# Patient Record
Sex: FEMALE | Race: WHITE | NOT HISPANIC OR LATINO | URBAN - METROPOLITAN AREA
[De-identification: names, ages, dates, MRNs, and addresses within clinical notes are randomized per-mention and may not be internally consistent; named-entity substitution may affect disease eponyms.]

---

## 2019-03-09 ENCOUNTER — EMERGENCY (EMERGENCY)
Facility: HOSPITAL | Age: 49
LOS: 1 days | Discharge: HOME | End: 2019-03-09
Attending: EMERGENCY MEDICINE | Admitting: EMERGENCY MEDICINE
Payer: COMMERCIAL

## 2019-03-09 VITALS
DIASTOLIC BLOOD PRESSURE: 76 MMHG | TEMPERATURE: 99 F | HEIGHT: 67 IN | WEIGHT: 125 LBS | OXYGEN SATURATION: 99 % | RESPIRATION RATE: 20 BRPM | SYSTOLIC BLOOD PRESSURE: 139 MMHG | HEART RATE: 107 BPM

## 2019-03-09 PROCEDURE — 73502 X-RAY EXAM HIP UNI 2-3 VIEWS: CPT

## 2019-03-09 PROCEDURE — 73502 X-RAY EXAM HIP UNI 2-3 VIEWS: CPT | Mod: 26,RT

## 2019-03-09 PROCEDURE — 72170 X-RAY EXAM OF PELVIS: CPT

## 2019-03-09 PROCEDURE — 99283 EMERGENCY DEPT VISIT LOW MDM: CPT

## 2019-03-09 PROCEDURE — 99284 EMERGENCY DEPT VISIT MOD MDM: CPT

## 2019-03-09 RX ORDER — IBUPROFEN 200 MG
600 TABLET ORAL ONCE
Qty: 0 | Refills: 0 | Status: COMPLETED | OUTPATIENT
Start: 2019-03-09 | End: 2019-03-09

## 2019-03-09 RX ADMIN — Medication 600 MILLIGRAM(S): at 22:21

## 2019-03-09 NOTE — ED PROVIDER NOTE - OBJECTIVE STATEMENT
Pt previously healthy with complaints of r hip pain after skiing accident where fell on r hip, no assoc numbness, paresthesias, no head injury. no assoc sob, chest pain, Pt has not tried anything for symptoms, no other aggravating or relieving factors.

## 2019-03-09 NOTE — ED ADULT TRIAGE NOTE - OTHER COMPLAINTS
CC of fall while skiing downhill, hit the snow when she fell, no head trauma, LOC, vomiting having hard time to move R hip.

## 2019-03-09 NOTE — CONSULT NOTE ADULT - ASSESSMENT
A/P: 49yFemale w no significant PMHx presents w R inferior pubic ramus fracture    -No surgical intervention necessary at this time  -WBAT RLE  -Crutches for comfort w ambulation  -Pain medication as per ED  -Outpatient follow-up with Dr. Marie  -Discussed with Dr. Marie    Ortho Pager [phone]

## 2019-03-09 NOTE — ED PROVIDER NOTE - NSFOLLOWUPINSTRUCTIONS_ED_ALL_ED_FT
Pelvic Fracture    WHAT YOU NEED TO KNOW:    What is a pelvic fracture? A pelvic fracture is a break in 1 or more of your 5 pelvic (hip) bones. This also includes a fracture of the acetabulum, the part of your pelvis that makes up your hip joint. Pelvic fractures can be caused by a car accident or a fall from a great height. Some pelvic fractures are caused by minor falls or injuries.     What are the signs and symptoms of a pelvic fracture?     Pain and tenderness in your pelvic bone area      Bruising and swelling over your pelvic bones      Numbness or tingling in your genital area or in your upper thighs      Discomfort or pain when you sit, stand, walk, or have a bowel movement    How is a pelvic fracture diagnosed? Your healthcare provider will examine your pelvic area. He will also check the range of motion of your hip. An x-ray or CT scan of your pelvis is used to check for broken bones. You may be given a dye before the scan. Tell your healthcare provider if you have ever had an allergic reaction to contrast dye.    How is a pelvic fracture treated? Treatment depends on the kind of fracture you have. You may need any of the following:    Prescription pain medicine may be given. Ask your healthcare provider how to take this medicine safely.      Bed rest will be needed while your fracture heals.       Apply ice on your hip for 15 to 20 minutes every hour or as directed. Use an ice pack, or put crushed ice in a plastic bag. Cover it with a towel. Ice helps prevent tissue damage and decreases swelling and pain.      Crutches or a walker may be needed to keep weight off your hip bone until it heals.       An external fixation device may be put on your hips to hold the broken bones together while they heal. Screws or a clamp will be used to hold the device to your pelvic bones.       Surgery may be needed for a severe pelvic fracture. Metal pins, screws, or plates may be used to hold your pelvic bone together.    When should I contact my healthcare provider?     Your skin is itchy, swollen, or has a rash.       Your pain or swelling increases.      You have new symptoms.      You have a fever.       You have questions or concerns about your condition or care.    When should I seek immediate care or call 911?     You feel lightheaded, short of breath, and have chest pain.      You cough up blood.      Your leg feels warm, tender, and painful. It may look swollen and red.    CARE AGREEMENT:    You have the right to help plan your care. Learn about your health condition and how it may be treated. Discuss treatment options with your healthcare providers to decide what care you want to receive. You always have the right to refuse treatment.        © Copyright [employer] 2019 All illustrations and images included in CareNotes are the copyrighted property of [email or web address] or [employer].      back to top                      © Copyright [employer] 2019

## 2019-03-09 NOTE — ED ADULT NURSE NOTE - NSIMPLEMENTINTERV_GEN_ALL_ED
Implemented All Fall Risk Interventions:  Baker to call system. Call bell, personal items and telephone within reach. Instruct patient to call for assistance. Room bathroom lighting operational. Non-slip footwear when patient is off stretcher. Physically safe environment: no spills, clutter or unnecessary equipment. Stretcher in lowest position, wheels locked, appropriate side rails in place. Provide visual cue, wrist band, yellow gown, etc. Monitor gait and stability. Monitor for mental status changes and reorient to person, place, and time. Review medications for side effects contributing to fall risk. Reinforce activity limits and safety measures with patient and family.

## 2019-03-09 NOTE — CONSULT NOTE ADULT - SUBJECTIVE AND OBJECTIVE BOX
Orthopaedic Surgery Consult Note    For Surgeon: Cynthia	    HPI:  49yFemale who presents w R hip pain following a recent skiing accident. Pt states she was skiing and suddenly lost her balance after hitting a large snow bank and fell onto her R side with immediate pain in her R hip. Pt has been intermittently able to ambulate w significant pain in R hip. Denies other injuries or sxs at this time. Denies any preceding sxs prior to her fall. Denies head trauma/LOC    Allergies    penicillin (Rash; Urticaria)    Intolerances      PAST MEDICAL & SURGICAL HISTORY:    MEDICATIONS  (STANDING):    MEDICATIONS  (PRN):      Vital Signs Last 24 Hrs  T(C): 37.1 (09 Mar 2019 20:08), Max: 37.1 (09 Mar 2019 20:08)  T(F): 98.7 (09 Mar 2019 20:08), Max: 98.7 (09 Mar 2019 20:08)  HR: 107 (09 Mar 2019 20:08) (107 - 107)  BP: 139/76 (09 Mar 2019 20:08) (139/76 - 139/76)  BP(mean): --  RR: 20 (09 Mar 2019 20:08) (20 - 20)  SpO2: 99% (09 Mar 2019 20:08) (99% - 99%)    Physical Exam:  AVSS  General: Adult female lying supine; NAD; A&Ox3  RLE: No obvious deformity of the extremity; No significant areas of swelling/erythema/warmth; TTP in R groin area but non-tender elsewhere over the extremity; No pain w log roll or heel strike; NVID; Sensation symmetric to LLE; Foot WWP; TA/EHL/FHL/GS 5/5 and symmetric; No pain w passive hip ROM; Hip pain w active straight leg raise          Imaging: AP Pelvis/R Hip XR shows minimally displaced R inferior pubic ramus fracture

## 2019-03-09 NOTE — ED PROVIDER NOTE - PHYSICAL EXAMINATION
CONSTITUTIONAL: Well appearing, well nourished, awake, alert and in no apparent distress.  HEENT: Head is atraumatic. Eyes clear bilaterally, normal EOMI. Airway patent.  CARDIAC: Normal rate, regular rhythm.  Heart sounds S1, S2.   RESPIRATORY: Breath sounds clear and equal bilaterally. no tachypnea, respiratory distress.   GASTROINTESTINAL: Abdomen soft, non-tender, no guarding, distension.  MUSCULOSKELETAL: Spine appears normal, no midline spinal tenderness, range of motion is not limited, no muscle or joint tenderness. no bony tenderness. no JVD, peripheral edema. pain to R groin  NEUROLOGICAL: Alert and oriented, no focal deficits, no motor or sensory deficits.  SKIN: Skin normal color for race, warm, dry and intact. No evidence of rash.  PSYCHIATRIC: Alert and oriented to person, place, time/situation. normal mood and affect. no apparent risk to self or others.

## 2019-03-09 NOTE — ED PROVIDER NOTE - CARE PROVIDER_API CALL
Easton Marie)  Orthopaedic Surgery Surgery  [address] [place] [address]  Phone: [phone]  Fax: [phone]  Follow Up Time:

## 2019-03-10 VITALS
HEART RATE: 93 BPM | SYSTOLIC BLOOD PRESSURE: 130 MMHG | OXYGEN SATURATION: 96 % | TEMPERATURE: 99 F | RESPIRATION RATE: 18 BRPM | DIASTOLIC BLOOD PRESSURE: 76 MMHG

## 2019-03-10 RX ADMIN — Medication 600 MILLIGRAM(S): at 00:12

## 2019-03-13 DIAGNOSIS — S32.501A UNSPECIFIED FRACTURE OF RIGHT PUBIS, INITIAL ENCOUNTER FOR CLOSED FRACTURE: ICD-10-CM
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
## 2019-03-13 DIAGNOSIS — R10.31 RIGHT LOWER QUADRANT PAIN: ICD-10-CM

## 2019-03-13 DIAGNOSIS — Z88.0 ALLERGY STATUS TO PENICILLIN: ICD-10-CM
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
## 2019-03-13 PROBLEM — Z00.00 ENCOUNTER FOR PREVENTIVE HEALTH EXAMINATION: Status: ACTIVE | Noted: 2019-03-13

## 2019-03-18 ENCOUNTER — FORM ENCOUNTER (OUTPATIENT)
Age: 49
End: 2019-03-18

## 2019-03-19 ENCOUNTER — OUTPATIENT (OUTPATIENT)
Dept: OUTPATIENT SERVICES | Facility: HOSPITAL | Age: 49
LOS: 1 days | End: 2019-03-19
Payer: COMMERCIAL

## 2019-03-19 ENCOUNTER — APPOINTMENT (OUTPATIENT)
Dept: CT IMAGING | Facility: CLINIC | Age: 49
End: 2019-03-19

## 2019-03-19 ENCOUNTER — APPOINTMENT (OUTPATIENT)
Dept: ORTHOPEDIC SURGERY | Facility: CLINIC | Age: 49
End: 2019-03-19
Payer: MEDICAID

## 2019-03-19 ENCOUNTER — APPOINTMENT (OUTPATIENT)
Dept: RADIOLOGY | Facility: CLINIC | Age: 49
End: 2019-03-19

## 2019-03-19 DIAGNOSIS — Z78.9 OTHER SPECIFIED HEALTH STATUS: ICD-10-CM

## 2019-03-19 DIAGNOSIS — S32.111A MINIMALLY DISPLACED ZONE I FRACTURE OF SACRUM, INITIAL ENCOUNTER FOR CLOSED FRACTURE: ICD-10-CM

## 2019-03-19 DIAGNOSIS — Z81.8 FAMILY HISTORY OF OTHER MENTAL AND BEHAVIORAL DISORDERS: ICD-10-CM

## 2019-03-19 PROCEDURE — 72192 CT PELVIS W/O DYE: CPT | Mod: 26

## 2019-03-19 PROCEDURE — 27197 CLSD TX PELVIC RING FX: CPT

## 2019-03-19 PROCEDURE — 72190 X-RAY EXAM OF PELVIS: CPT | Mod: 26

## 2019-03-19 PROCEDURE — 72190 X-RAY EXAM OF PELVIS: CPT

## 2019-03-19 PROCEDURE — 99204 OFFICE O/P NEW MOD 45 MIN: CPT | Mod: 57

## 2019-03-19 PROCEDURE — 72192 CT PELVIS W/O DYE: CPT

## 2019-03-22 PROBLEM — Z81.8 FAMILY HISTORY OF DEMENTIA: Status: ACTIVE | Noted: 2019-03-19

## 2019-03-22 PROBLEM — S32.111A CLOSED MINIMALLY DISPLACED ZONE I FRACTURE OF SACRUM, INITIAL ENCOUNTER: Noted: 2019-03-19

## 2019-03-22 PROBLEM — Z78.9 EXERCISES OCCASIONALLY: Status: ACTIVE | Noted: 2019-03-19

## 2019-04-29 ENCOUNTER — FORM ENCOUNTER (OUTPATIENT)
Age: 49
End: 2019-04-29

## 2019-04-30 ENCOUNTER — APPOINTMENT (OUTPATIENT)
Dept: RADIOLOGY | Facility: CLINIC | Age: 49
End: 2019-04-30

## 2019-04-30 ENCOUNTER — APPOINTMENT (OUTPATIENT)
Dept: ORTHOPEDIC SURGERY | Facility: CLINIC | Age: 49
End: 2019-04-30
Payer: COMMERCIAL

## 2019-04-30 ENCOUNTER — OUTPATIENT (OUTPATIENT)
Dept: OUTPATIENT SERVICES | Facility: HOSPITAL | Age: 49
LOS: 1 days | End: 2019-04-30
Payer: COMMERCIAL

## 2019-04-30 DIAGNOSIS — S32.591A OTHER SPECIFIED FRACTURE OF RIGHT PUBIS, INITIAL ENCOUNTER FOR CLOSED FRACTURE: ICD-10-CM

## 2019-04-30 DIAGNOSIS — S32.511A FRACTURE OF SUPERIOR RIM OF RIGHT PUBIS, INITIAL ENCOUNTER FOR CLOSED FRACTURE: ICD-10-CM

## 2019-04-30 DIAGNOSIS — S32.121A: ICD-10-CM

## 2019-04-30 PROCEDURE — 72190 X-RAY EXAM OF PELVIS: CPT

## 2019-04-30 PROCEDURE — 99212 OFFICE O/P EST SF 10 MIN: CPT

## 2019-04-30 PROCEDURE — 72190 X-RAY EXAM OF PELVIS: CPT | Mod: 26

## 2019-06-28 ENCOUNTER — MOBILE ON CALL (OUTPATIENT)
Age: 49
End: 2019-06-28

## 2019-06-30 ENCOUNTER — FORM ENCOUNTER (OUTPATIENT)
Age: 49
End: 2019-06-30

## 2019-07-01 ENCOUNTER — OUTPATIENT (OUTPATIENT)
Dept: OUTPATIENT SERVICES | Facility: HOSPITAL | Age: 49
LOS: 1 days | End: 2019-07-01
Payer: COMMERCIAL

## 2019-07-01 ENCOUNTER — APPOINTMENT (OUTPATIENT)
Dept: RADIOLOGY | Facility: CLINIC | Age: 49
End: 2019-07-01

## 2019-07-01 ENCOUNTER — APPOINTMENT (OUTPATIENT)
Dept: ORTHOPEDIC SURGERY | Facility: CLINIC | Age: 49
End: 2019-07-01
Payer: MEDICAID

## 2019-07-01 VITALS — BODY MASS INDEX: 19.62 KG/M2 | WEIGHT: 125 LBS | HEIGHT: 67 IN

## 2019-07-01 DIAGNOSIS — S32.9XXA FRACTURE OF UNSPECIFIED PARTS OF LUMBOSACRAL SPINE AND PELVIS, INITIAL ENCOUNTER FOR CLOSED FRACTURE: ICD-10-CM

## 2019-07-01 PROCEDURE — 72190 X-RAY EXAM OF PELVIS: CPT | Mod: 26

## 2019-07-01 PROCEDURE — 99213 OFFICE O/P EST LOW 20 MIN: CPT

## 2019-07-01 PROCEDURE — 72190 X-RAY EXAM OF PELVIS: CPT

## 2020-12-08 ENCOUNTER — APPOINTMENT (OUTPATIENT)
Dept: ORTHOPEDIC SURGERY | Facility: CLINIC | Age: 50
End: 2020-12-08
Payer: MEDICAID

## 2020-12-08 VITALS — RESPIRATION RATE: 16 BRPM | HEIGHT: 67 IN | BODY MASS INDEX: 19.78 KG/M2 | WEIGHT: 126 LBS

## 2020-12-08 DIAGNOSIS — M25.551 PAIN IN RIGHT HIP: ICD-10-CM

## 2020-12-08 PROCEDURE — 99214 OFFICE O/P EST MOD 30 MIN: CPT

## 2020-12-08 PROCEDURE — 99072 ADDL SUPL MATRL&STAF TM PHE: CPT

## 2021-03-15 ENCOUNTER — APPOINTMENT (OUTPATIENT)
Dept: ENDOCRINOLOGY | Facility: CLINIC | Age: 51
End: 2021-03-15
Payer: COMMERCIAL

## 2021-03-15 VITALS
WEIGHT: 125 LBS | HEIGHT: 67 IN | SYSTOLIC BLOOD PRESSURE: 128 MMHG | HEART RATE: 89 BPM | DIASTOLIC BLOOD PRESSURE: 87 MMHG | BODY MASS INDEX: 19.62 KG/M2

## 2021-03-15 PROCEDURE — 99072 ADDL SUPL MATRL&STAF TM PHE: CPT

## 2021-03-15 PROCEDURE — 99205 OFFICE O/P NEW HI 60 MIN: CPT

## 2021-03-15 NOTE — HISTORY OF PRESENT ILLNESS
[FreeTextEntry1] : 52 y/o F self referral for thyroid nodules evaluation.\par Internist, Dr. Ceja [phone]\par She was initially diagnosed with thyroid nodules in 2009 she underwent a biopsy of 4 nodules in the left lobe : benign ( goiter)\par She also was diagnosed in the same year with Hashimoto thyroiditis ( Pat seen by Jason Khan )\par Her thyroid test and thyroid nodules have been monitored on and off over the past 10 years she never was Rx thyroid supplementation and couple months ago she went to see her internist  who recommend recommend an endocrinologist visit\par Overall patient is feeling well no complaints she denies speech/swallowing/gradient complains\par No palpitations\par Medicines: None\par September 3, 2020, thyroid ultrasound left lobe size is normal. Contains a 2.1 x 1.6 cm solid nodule with a small echogenic densities in the mid pole and 1.9 x 1.7 cm solid nodule in the lower pole to be a there is also a 2.4 x 1.7 cm solid nodule in the lower pole.\par Other PMHx:  Hip bone fracture during skiing in 2019. Ni direct or indirect radiation exposure as a kid\par Allergy: PCN\par FHx:Thyroid  (Mother). No family history of thyroid cancer\par SHx: [employer], no etoh, no tobacco \par Lifestyle: Physically active. \par 11/30/2009: Thyroid u.s: Multiple nodules seen scattered throughout this lobe,Dominant nodule in the lower pole measure 2.5 cm\par 11/30/2009, TPO 81.7 ( < 60) \par \par

## 2021-03-15 NOTE — ASSESSMENT
[FreeTextEntry1] : 1. Multiple thyroid nodules diagnosed in 2009 with a benign FNA of left lobe biopsy the  same year\par Her recent followup visit with her internist, she was recommended FNA to have followup with endocrinologist\par Patient is asymptomatic, denies to speech, breathing swallowing difficulties\par No history of thyroid cancer neither radiation exposure as a child/ puber\par Send for high resolution thyroid u.s that include lymph nodes.\par \par 2. Increase level of TPO ab in 2009\par She appeared appears to be euthyroid clinically\par Send TFT and thyroid ab\par Return in a month

## 2021-03-15 NOTE — PHYSICAL EXAM
[Alert] : alert [Normal Outer Ear/Nose] : the ears and nose were normal in appearance [No Respiratory Distress] : no respiratory distress [Normal PMI] : the apical impulse was normal [Carotids Normal] : carotid pulses were normal with no bruits [Normal Bowel Sounds] : normal bowel sounds [No CVA Tenderness] : no ~M costovertebral angle tenderness [No Stigmata of Cushings Syndrome] : no stigmata of Cushings Syndrome [Normal Gait] : normal gait [No Rash] : no rash [Normal Reflexes] : deep tendon reflexes were 2+ and symmetric [Oriented x3] : oriented to person, place, and time [de-identified] : Left lumps visualized and palpated at the left lobe, smooth borders and no tenderness. No LN enlargement

## 2021-03-19 ENCOUNTER — OUTPATIENT (OUTPATIENT)
Dept: OUTPATIENT SERVICES | Facility: HOSPITAL | Age: 51
LOS: 1 days | End: 2021-03-19

## 2021-03-19 ENCOUNTER — APPOINTMENT (OUTPATIENT)
Dept: ULTRASOUND IMAGING | Facility: CLINIC | Age: 51
End: 2021-03-19
Payer: COMMERCIAL

## 2021-03-19 PROCEDURE — 76536 US EXAM OF HEAD AND NECK: CPT | Mod: 26

## 2021-04-07 ENCOUNTER — APPOINTMENT (OUTPATIENT)
Dept: ENDOCRINOLOGY | Facility: CLINIC | Age: 51
End: 2021-04-07
Payer: COMMERCIAL

## 2021-04-07 VITALS — HEART RATE: 79 BPM | SYSTOLIC BLOOD PRESSURE: 118 MMHG | DIASTOLIC BLOOD PRESSURE: 77 MMHG

## 2021-04-07 PROCEDURE — 99214 OFFICE O/P EST MOD 30 MIN: CPT

## 2021-04-07 PROCEDURE — 99072 ADDL SUPL MATRL&STAF TM PHE: CPT

## 2021-04-09 NOTE — REVIEW OF SYSTEMS
[Negative] : Heme/Lymph [Dysphagia] : no dysphagia [Dysphonia] : no dysphonia [Palpitations] : no palpitations [Difficulty Breathing] : no dyspnea

## 2021-04-09 NOTE — ASSESSMENT
Patient tolerated bilateral lumbar medial branch block well.  Patient states pain is 7/10 which is improved from 10/10 before procedure.  Patient educated on s/s of infection r/t procedure, \"for your well-being\" medial branch nerve block education reviewed with and given to patient-denies questions.  Patient discharged in stable condition.     [FreeTextEntry1] : 52 y/o F pt with:\par \par 1. Multiple thyroid nodules (dx in 2009):\par FNA biopsies on 09/08/09 of 4 L lobe nodules including L mid 1.3 cm, L mid-lower, L #3 and L #4 nodules: Nodular goiter\par On 3/15/21, she had her initial visit with us. Reviewed the most recent thyroid US on 3/19/21 which shows presence of L lobe with 3 visible nodules. See HPI for details. \par Radiologist recommends FNA biopsy for 3 L lobe nodules. \par In 2009, L upper pole measures 3.9 cm. \par TSH was normal on 3/15/21. \par Pt asked numerous question and we answered all the question to her satisfaction. \par Refer pt for FNA biopsy. \par \par Return in: 3 weeks

## 2021-04-09 NOTE — HISTORY OF PRESENT ILLNESS
[FreeTextEntry1] : 50 y/o F, with Hx of thyroid nodules (dx in 2009), and Hx of Hashimoto thyroiditis (dx in 2009). \par Other PMHx:  Hip bone fx (2019 during skiing)\par No Hx of head and neck radiation exposure during childhood. \par FHx: Thyroid (mother). No FHx of thyroid CA. \par SHx: Non smoker. No EtOH use. Works as [employer]. \par Lifestyle: Physically active. \par PCP: Dr. Ceja [phone]\par \par 03/15/21\par She was initially diagnosed with thyroid nodules in 2009 she underwent a biopsy of 4 nodules in the left lobe : benign (goiter)\par She also was diagnosed in the same year with Hashimoto thyroiditis ( Pat seen by Jason Khan )\par Her thyroid test and thyroid nodules have been monitored on and off over the past 10 years she never was Rx thyroid supplementation and couple months ago she went to see her internist  who recommend recommend an endocrinologist visit\par Overall patient is feeling well no complaints she denies speech/swallowing/gradient complains\par No palpitations\par \par 04/07/2021\par Pt presents today with /77 for thyroid f/u. She is feeling generally well with no major physical complaints. \par Pt states that she "would not like to do FNA biopsies if it is not absolutely necessary because the process is very painful". \par Denies speech, swallowing or breathing difficulties and palpitations. \par \par Current Medications: None\par \par Labs:\par - 03/19/21 Thyroid US: R lobe contains no visible nodules. L lobe contains 3 visible nodules. Nodule 1 - interpolar 1.8 x 1.8 x 2.1 cm solid. Nodule 2 - interpolar 2.5 x 2.3 x 2.5 cm predominantly solid with cystic components. Nodule 3 - interpolar 2.6 x 1.5 x 2.1 cm solid. Impression: 3 L nodules, which all meet FNA criteria. \par - 03/15/21: TSH 1.37, Free T4 1.3, TPO Ab 13.0, Thyroglobulin Ab <20\par - 09/03/20 Thyroid US: L lobe contains 3 nodules. Nodule 1 - midpole 2.1 x 1.6 cm solid with small echogenic densities. Nodule 2 - lower pole 1.9 x 1.7 cm solid. Nodule 3 - lower pole 2.4 z 1.7 cm solid nodule. \par - 11/30/09: Thyroid US: Multiple nodules seen scattered throughout this lobe. Dominant nodule in the lower pole measure 2.5 cm.\par - 11/30/09: TSH 1.67, Free T4 1.13, Free T3 3.2, T4 7.5, Total T3 88, TPO 81.7 (< 60), Thyroglobulin Ab 162.2, iPTH 29, Prolactin 8.9\par - 09/08/09 FNA biopsies of L mid 1.3 cm, L mid-lower, L #3 and L #4 nodules: Nodular goiter\par - 08/06/09 Thyroid US: Impression: 1) Enlarged multinodular L lobe with increased vascularity. 2) Two L thyroid nodules contain echogenic internal foci which may represent microcalcifications. FNA of these nodules is recommended. 3) Additional isoechoic nodule in the L lobe. F/u US in 6 months is recommended to ensure stability. 4) Normal R lobe.

## 2021-04-09 NOTE — END OF VISIT
[FreeTextEntry3] : All medical record entries made by the Scribe were at my, Dr. Александр Benton, direction and personally dictated by me on 04/07/2021. I have reviewed the chart and agree that the record accurately reflects my personal performance of the history, physical exam, assessment and plan. I have also personally directed, reviewed and agreed with the chart.  [Time Spent: ___ minutes] : I have spent [unfilled] minutes of time on the encounter.

## 2021-04-09 NOTE — PHYSICAL EXAM
[Alert] : alert [Normal Outer Ear/Nose] : the ears and nose were normal in appearance [No Respiratory Distress] : no respiratory distress [Normal PMI] : the apical impulse was normal [Carotids Normal] : carotid pulses were normal with no bruits [Normal Bowel Sounds] : normal bowel sounds [No CVA Tenderness] : no ~M costovertebral angle tenderness [No Stigmata of Cushings Syndrome] : no stigmata of Cushings Syndrome [Normal Gait] : normal gait [No Rash] : no rash [Normal Reflexes] : deep tendon reflexes were 2+ and symmetric [Oriented x3] : oriented to person, place, and time [de-identified] : Left lumps visualized and palpated at the left lobe, smooth borders and no tenderness. No LN enlargement

## 2021-04-09 NOTE — ADDENDUM
[FreeTextEntry1] : I, Mary Lee, acted solely as a scribe for Dr. Александр Benton on this date. 04/07/2021.

## 2021-05-28 ENCOUNTER — NON-APPOINTMENT (OUTPATIENT)
Age: 51
End: 2021-05-28

## 2021-06-01 LAB
ALBUMIN SERPL ELPH-MCNC: 5.4 G/DL
ALP BLD-CCNC: 76 U/L
ALT SERPL-CCNC: 12 U/L
ANION GAP SERPL CALC-SCNC: 11 MMOL/L
AST SERPL-CCNC: 16 U/L
BILIRUB SERPL-MCNC: 0.4 MG/DL
BUN SERPL-MCNC: 14 MG/DL
CALCIUM SERPL-MCNC: 10.5 MG/DL
CHLORIDE SERPL-SCNC: 101 MMOL/L
CO2 SERPL-SCNC: 28 MMOL/L
CREAT SERPL-MCNC: 0.92 MG/DL
GLUCOSE SERPL-MCNC: 92 MG/DL
POTASSIUM SERPL-SCNC: 4.5 MMOL/L
PROT SERPL-MCNC: 7.9 G/DL
SODIUM SERPL-SCNC: 140 MMOL/L
T4 FREE SERPL-MCNC: 1.3 NG/DL
THYROGLOB AB SERPL-ACNC: <20 IU/ML
THYROPEROXIDASE AB SERPL IA-ACNC: 13 IU/ML
TSH SERPL-ACNC: 1.37 UIU/ML

## 2023-07-05 NOTE — ED ADULT NURSE NOTE - GASTROINTESTINAL ASSESSMENT
Refill Decision Note   Isha Thomas  is requesting a refill authorization.  Brief Assessment and Rationale for Refill:  Approve     Medication Therapy Plan:         Comments:     No Care Gaps recommended.     Note composed:6:58 AM 07/05/2023             WDL

## 2024-02-27 ENCOUNTER — APPOINTMENT (OUTPATIENT)
Dept: ENDOCRINOLOGY | Facility: CLINIC | Age: 54
End: 2024-02-27
Payer: COMMERCIAL

## 2024-02-27 VITALS
HEART RATE: 77 BPM | BODY MASS INDEX: 20.25 KG/M2 | WEIGHT: 129 LBS | HEIGHT: 67 IN | SYSTOLIC BLOOD PRESSURE: 127 MMHG | DIASTOLIC BLOOD PRESSURE: 80 MMHG

## 2024-02-27 PROCEDURE — 99214 OFFICE O/P EST MOD 30 MIN: CPT

## 2024-02-27 NOTE — REASON FOR VISIT
[Thyroid nodule/ MNG] : thyroid nodule/ MNG [Follow - Up] : a follow-up visit [Osteoporosis] : osteoporosis

## 2024-02-29 NOTE — PHYSICAL EXAM
[Normal Outer Ear/Nose] : the ears and nose were normal in appearance [Normal PMI] : the apical impulse was normal [No Respiratory Distress] : no respiratory distress [Carotids Normal] : carotid pulses were normal with no bruits [Normal Bowel Sounds] : normal bowel sounds [No CVA Tenderness] : no ~M costovertebral angle tenderness [No Stigmata of Cushings Syndrome] : no stigmata of Cushings Syndrome [Normal Gait] : normal gait [No Rash] : no rash [Oriented x3] : oriented to person, place, and time [Normal Reflexes] : deep tendon reflexes were 2+ and symmetric [No Acute Distress] : no acute distress [Normal Sclera/Conjunctiva] : normal sclera/conjunctiva [de-identified] : Left lumps visualized and palpated at the left lobe, smooth borders and no tenderness. No LN enlargement

## 2024-02-29 NOTE — ADDENDUM
[FreeTextEntry1] : I, Howard You act solely as a scribe for Dr. Александр Benton on this date 02/27/2024

## 2024-02-29 NOTE — END OF VISIT
[FreeTextEntry3] :  All medical record entries made by the Scribe were at my, Dr. Александр Benton, direction and personally dictated by me on 02/27/2024. I have reviewed the chart and agree that the record accurately reflects my personal performance of the history, physical exam, assessment and plan. I have also personally directed, reviewed and agreed with the chart. [Time Spent: ___ minutes] : I have spent [unfilled] minutes of time on the encounter.

## 2024-02-29 NOTE — DATA REVIEWED
[FreeTextEntry1] : 05/11/21 FNA Specimen A: left lobe mid pole posterior [place] II. Specimen B: left lobe mid pole anterior [place] II. Specimen C: left lobe mid pole medial [place] II.

## 2024-02-29 NOTE — RESULTS/DATA
[Other: ________] : [unfilled] [BMD ___ g/cm2] : BMD: [unfilled] g/cm2 [T-Score ___] : T-score: [unfilled] [FreeTextEntry2] : 01/19/24 [FreeTextEntry1] : Impression: The patient has established osteoporosis, based on the total Spine T-score and the existence of a prior fracture. The patient has risk factors, including: previous fractures. No significant bone loss was observed.  [FreeTextEntry4] : 09/03/20 [de-identified] : Impression: The patient has established osteoporosis, based on the total Spine T-score and the existence of a prior fracture. The patient has risk factors, including: previous fractures.

## 2024-03-17 LAB
25(OH)D3 SERPL-MCNC: 20.4 NG/ML
ALBUMIN SERPL ELPH-MCNC: 4.7 G/DL
ALP BLD-CCNC: 83 U/L
ALT SERPL-CCNC: 10 U/L
ANION GAP SERPL CALC-SCNC: 10 MMOL/L
AST SERPL-CCNC: 13 U/L
BASOPHILS # BLD AUTO: 0.03 K/UL
BASOPHILS NFR BLD AUTO: 0.7 %
BILIRUB SERPL-MCNC: 0.5 MG/DL
BUN SERPL-MCNC: 13 MG/DL
CALCIUM SERPL-MCNC: 9.7 MG/DL
CALCIUM SERPL-MCNC: 9.7 MG/DL
CHLORIDE SERPL-SCNC: 103 MMOL/L
CO2 SERPL-SCNC: 28 MMOL/L
CREAT SERPL-MCNC: 0.85 MG/DL
EGFR: 81 ML/MIN/1.73M2
EOSINOPHIL # BLD AUTO: 0.07 K/UL
EOSINOPHIL NFR BLD AUTO: 1.6 %
ESTIMATED AVERAGE GLUCOSE: 105 MG/DL
GLUCOSE SERPL-MCNC: 95 MG/DL
HBA1C MFR BLD HPLC: 5.3 %
HCT VFR BLD CALC: 44.8 %
HGB BLD-MCNC: 14.7 G/DL
IMM GRANULOCYTES NFR BLD AUTO: 0.5 %
LYMPHOCYTES # BLD AUTO: 1.24 K/UL
LYMPHOCYTES NFR BLD AUTO: 28.4 %
MAN DIFF?: NORMAL
MCHC RBC-ENTMCNC: 28.8 PG
MCHC RBC-ENTMCNC: 32.8 GM/DL
MCV RBC AUTO: 87.8 FL
MONOCYTES # BLD AUTO: 0.28 K/UL
MONOCYTES NFR BLD AUTO: 6.4 %
NEUTROPHILS # BLD AUTO: 2.72 K/UL
NEUTROPHILS NFR BLD AUTO: 62.4 %
PARATHYROID HORMONE INTACT: 51 PG/ML
PLATELET # BLD AUTO: 233 K/UL
POTASSIUM SERPL-SCNC: 4.6 MMOL/L
PROLACTIN SERPL-MCNC: 12.1 NG/ML
PROT SERPL-MCNC: 7 G/DL
RBC # BLD: 5.1 M/UL
RBC # FLD: 12.3 %
SODIUM SERPL-SCNC: 141 MMOL/L
WBC # FLD AUTO: 4.36 K/UL

## 2024-03-18 ENCOUNTER — APPOINTMENT (OUTPATIENT)
Dept: ENDOCRINOLOGY | Facility: CLINIC | Age: 54
End: 2024-03-18
Payer: COMMERCIAL

## 2024-03-18 VITALS
DIASTOLIC BLOOD PRESSURE: 85 MMHG | WEIGHT: 129 LBS | SYSTOLIC BLOOD PRESSURE: 133 MMHG | BODY MASS INDEX: 20.2 KG/M2 | HEART RATE: 90 BPM

## 2024-03-18 PROCEDURE — 99214 OFFICE O/P EST MOD 30 MIN: CPT

## 2024-03-19 RX ORDER — ERGOCALCIFEROL 1.25 MG/1
1.25 MG CAPSULE, LIQUID FILLED ORAL
Qty: 12 | Refills: 0 | Status: ACTIVE | COMMUNITY
Start: 2024-03-18 | End: 1900-01-01

## 2024-03-20 NOTE — RESULTS/DATA
[Other: ________] : [unfilled] [BMD ___ g/cm2] : BMD: [unfilled] g/cm2 [T-Score ___] : T-score: [unfilled] [FreeTextEntry1] : Impression: The patient has established osteoporosis, based on the total Spine T-score and the existence of a prior fracture. The patient has risk factors, including: previous fractures. No significant bone loss was observed.  [FreeTextEntry2] : 01/19/24 [FreeTextEntry4] : 09/03/20 [de-identified] : Impression: The patient has established osteoporosis, based on the total Spine T-score and the existence of a prior fracture. The patient has risk factors, including: previous fractures.

## 2024-03-20 NOTE — DATA REVIEWED
[FreeTextEntry1] : 05/11/21 FNA Specimen A: left lobe mid pole posterior [place] II. Specimen B: left lobe mid pole anterior [place] II. Specimen C: left lobe mid pole medial [place] II.

## 2024-03-20 NOTE — END OF VISIT
[FreeTextEntry3] :  All medical record entries made by the Scribe were at my, Dr. Александр Benton, direction and personally dictated by me on 03/18/2024. I have reviewed the chart and agree that the record accurately reflects my personal performance of the history, physical exam, assessment and plan. I have also personally directed, reviewed and agreed with the chart. [Time Spent: ___ minutes] : I have spent [unfilled] minutes of time on the encounter.
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
## 2024-03-20 NOTE — ADDENDUM
[FreeTextEntry1] : I, Howard You act solely as a scribe for Dr. Александр Benton on this date 03/18/2024

## 2024-05-28 ENCOUNTER — APPOINTMENT (OUTPATIENT)
Dept: ULTRASOUND IMAGING | Facility: HOSPITAL | Age: 54
End: 2024-05-28
Payer: COMMERCIAL

## 2024-05-28 ENCOUNTER — OUTPATIENT (OUTPATIENT)
Dept: OUTPATIENT SERVICES | Facility: HOSPITAL | Age: 54
LOS: 1 days | End: 2024-05-28
Payer: COMMERCIAL

## 2024-05-28 ENCOUNTER — LABORATORY RESULT (OUTPATIENT)
Age: 54
End: 2024-05-28

## 2024-05-28 PROCEDURE — 76536 US EXAM OF HEAD AND NECK: CPT | Mod: 26

## 2024-05-28 PROCEDURE — 76536 US EXAM OF HEAD AND NECK: CPT

## 2024-06-04 DIAGNOSIS — Z86.39 PERSONAL HISTORY OF OTHER ENDOCRINE, NUTRITIONAL AND METABOLIC DISEASE: ICD-10-CM

## 2024-06-05 ENCOUNTER — APPOINTMENT (OUTPATIENT)
Dept: ENDOCRINOLOGY | Facility: CLINIC | Age: 54
End: 2024-06-05
Payer: COMMERCIAL

## 2024-06-05 VITALS
BODY MASS INDEX: 19.73 KG/M2 | SYSTOLIC BLOOD PRESSURE: 127 MMHG | WEIGHT: 126 LBS | HEART RATE: 81 BPM | DIASTOLIC BLOOD PRESSURE: 85 MMHG

## 2024-06-05 DIAGNOSIS — E04.2 NONTOXIC MULTINODULAR GOITER: ICD-10-CM

## 2024-06-05 DIAGNOSIS — M81.0 AGE-RELATED OSTEOPOROSIS W/OUT CURRENT PATHOLOGICAL FRACTURE: ICD-10-CM

## 2024-06-05 PROCEDURE — 99214 OFFICE O/P EST MOD 30 MIN: CPT

## 2024-06-05 RX ORDER — DEXTROMETHORPHAN POLISTIREX 30 MG/5 ML
500-1000-40 SUSPENSION, EXTENDED RELEASE 12 HR ORAL
Qty: 90 | Refills: 0 | Status: ACTIVE | COMMUNITY
Start: 2024-06-05 | End: 1900-01-01

## 2024-06-05 RX ORDER — TERIPARATIDE 250 UG/ML
600 INJECTION, SOLUTION SUBCUTANEOUS
Qty: 1 | Refills: 1 | Status: ACTIVE | COMMUNITY
Start: 2024-06-05 | End: 1900-01-01

## 2024-06-06 PROBLEM — E04.2 MULTIPLE THYROID NODULES: Status: ACTIVE | Noted: 2021-03-15

## 2024-06-06 PROBLEM — M81.0 OSTEOPOROSIS, POST-MENOPAUSAL: Status: ACTIVE | Noted: 2024-02-27

## 2024-06-06 NOTE — DATA REVIEWED
[FreeTextEntry1] : 05/11/21 FNA Specimen A: left lobe mid pole posterior [place] II. Specimen B: left lobe mid pole anterior [place] II. Specimen C: left lobe mid pole medial [place] II.

## 2024-06-06 NOTE — RESULTS/DATA
[Other: ________] : [unfilled] [BMD ___ g/cm2] : BMD: [unfilled] g/cm2 [T-Score ___] : T-score: [unfilled] [FreeTextEntry2] : 01/19/24 [FreeTextEntry1] : Impression: The patient has established osteoporosis, based on the total Spine T-score and the existence of a prior fracture. The patient has risk factors, including: previous fractures. No significant bone loss was observed.  [FreeTextEntry4] : 09/03/20 [de-identified] : Impression: The patient has established osteoporosis, based on the total Spine T-score and the existence of a prior fracture. The patient has risk factors, including: previous fractures.

## 2024-06-06 NOTE — END OF VISIT
[Time Spent: ___ minutes] : I have spent [unfilled] minutes of time on the encounter. [FreeTextEntry3] :  All medical record entries made by the Scribe were at my, Dr. Александр Benton, direction and personally dictated by me on 06/05/2024. I have reviewed the chart and agree that the record accurately reflects my personal performance of the history, physical exam, assessment and plan. I have also personally directed, reviewed and agreed with the chart.

## 2024-06-06 NOTE — ADDENDUM
[FreeTextEntry1] : I, Howard You act solely as a scribe for Dr. Александр Benton on this date 06/05/2024

## 2024-06-06 NOTE — PHYSICAL EXAM
[No Acute Distress] : no acute distress [Normal Sclera/Conjunctiva] : normal sclera/conjunctiva [Normal Outer Ear/Nose] : the ears and nose were normal in appearance [No Respiratory Distress] : no respiratory distress [Normal PMI] : the apical impulse was normal [Carotids Normal] : carotid pulses were normal with no bruits [Normal Bowel Sounds] : normal bowel sounds [No CVA Tenderness] : no ~M costovertebral angle tenderness [No Stigmata of Cushings Syndrome] : no stigmata of Cushings Syndrome [Normal Gait] : normal gait [No Rash] : no rash [Normal Reflexes] : deep tendon reflexes were 2+ and symmetric [Oriented x3] : oriented to person, place, and time [de-identified] : Left lumps visualized and palpated at the left lobe, smooth borders and no tenderness. No LN enlargement

## 2024-06-06 NOTE — HISTORY OF PRESENT ILLNESS
[FreeTextEntry1] : 53 y/o F, with Hx of thyroid nodules (dx in 2009), and Hx of Hashimoto thyroiditis (dx in 2009).  Other PMHx:  Hip bone Right fracture (2019 during skiing), age 14-15 broken arm after push, 2017 broke nose after tripping hard in stairs, vit D deficiency ~2014,  No Hx of head and neck radiation exposure during childhood, no Crohn's, ulcerative colitis, prediabetes, steroid use, kidney disease or stones FHx: Thyroid (mother) No FHx of: hip fracture, thyroid CA SHx: Non smoker. Occasional EtOH use. Daily coffee, Works as [employer].  Lifestyle: Physically active.  PCP: Dr. Ceja [phone] First menses age 14 last menses 2017 Oral contraceptives for a long time, stopped 2005 (Age 35). States her physical activity of dancing and tennis has decreased gradually afterward and completely stopped after her hip fracture.   03/15/21 She was initially diagnosed with thyroid nodules in 2009 she underwent a biopsy of 4 nodules in the left lobe : benign (goiter) She also was diagnosed in the same year with Hashimoto thyroiditis ( Pat seen by Jason Khan ) Her thyroid test and thyroid nodules have been monitored on and off over the past 10 years she never was Rx thyroid supplementation and couple months ago she went to see her internist  who recommend recommend an endocrinologist visit Overall patient is feeling well no complaints she denies speech/swallowing/gradient complaints. No palpitations  04/07/2021 Pt presents today with /77 for thyroid f/u. She is feeling generally well with no major physical complaints.  Pt states that she "would not like to do FNA biopsies if it is not absolutely necessary because the process is very painful".  Denies speech, swallowing or breathing difficulties and palpitations.   Labs: - 03/19/21 Thyroid US: R lobe contains no visible nodules. L lobe contains 3 visible nodules. Nodule 1 - interpolar 1.8 x 1.8 x 2.1 cm solid. Nodule 2 - interpolar 2.5 x 2.3 x 2.5 cm predominantly solid with cystic components. Nodule 3 - interpolar 2.6 x 1.5 x 2.1 cm solid. Impression: 3 L nodules, which all meet FNA criteria.  - 03/15/21: TSH 1.37, Free T4 1.3, TPO Ab 13.0, Thyroglobulin Ab <20 - 09/03/20 Thyroid US: L lobe contains 3 nodules. Nodule 1 - midpole 2.1 x 1.6 cm solid with small echogenic densities. Nodule 2 - lower pole 1.9 x 1.7 cm solid. Nodule 3 - lower pole 2.4 z 1.7 cm solid nodule.  - 11/30/09: Thyroid US: Multiple nodules seen scattered throughout this lobe. Dominant nodule in the lower pole measure 2.5 cm. - 11/30/09: TSH 1.67, Free T4 1.13, Free T3 3.2, T4 7.5, Total T3 88, TPO 81.7 (< 60), Thyroglobulin Ab 162.2, iPTH 29, Prolactin 8.9 - 09/08/09 FNA biopsies of L mid 1.3 cm, L mid-lower, L #3 and L #4 nodules: Nodular goiter - 08/06/09 Thyroid US: Impression: 1) Enlarged multinodular L lobe with increased vascularity. 2) Two L thyroid nodules contain echogenic internal foci which may represent microcalcifications. FNA of these nodules is recommended. 3) Additional isoechoic nodule in the L lobe. F/u US in 6 months is recommended to ensure stability. 4) Normal R lobe.   02/27/2024  Pt has /80 and BMI 20.2. No significant weight change.  CC: " I'm here to go over my bone density report.  Pt states that she had a BMD originally ordered in 2020 by her PCP at the time. There was no follow up and pt had to change PCPs due to insurance. Her most recent BMD in 01/2024 was ordered by her new PCP, who subsequently referred her to an endocrinologist due to the severity of osteoporosis given her age.  Pt endorses losing an inch of height over 15 years.   03/18/2024  Pt has /85 and BMI 20.2. No significant weight change. CC: "I'm doing alright." Pt reports that she normally takes vit D3 every other day, but forgets when she is busy.  - 07/2023 Vit D 20.9 , 03/12/24 Vit D 20.4  06/05/2024  Pt has /85 and BMI 19.73. No significant weight change.  CC: "I'm alright." Pt will follow up with her dentist this month. She has plans to travel to [facility] this month.   [Medications verified as per pt on 06/05/2024] Medication modified/added this visit: vit D3 1000 IU started 01/2024